# Patient Record
Sex: FEMALE | Race: WHITE | Employment: OTHER | URBAN - METROPOLITAN AREA
[De-identification: names, ages, dates, MRNs, and addresses within clinical notes are randomized per-mention and may not be internally consistent; named-entity substitution may affect disease eponyms.]

---

## 2017-04-21 ENCOUNTER — OP HISTORICAL/CONVERTED ENCOUNTER (OUTPATIENT)
Dept: OTHER | Age: 67
End: 2017-04-21

## 2017-10-26 ENCOUNTER — ED HISTORICAL/CONVERTED ENCOUNTER (OUTPATIENT)
Dept: OTHER | Age: 67
End: 2017-10-26

## 2020-10-23 ENCOUNTER — TRANSCRIBE ORDER (OUTPATIENT)
Dept: SCHEDULING | Age: 70
End: 2020-10-23

## 2020-10-23 DIAGNOSIS — M25.511 RIGHT SHOULDER PAIN: ICD-10-CM

## 2020-10-23 DIAGNOSIS — M25.512 LEFT SHOULDER PAIN: Primary | ICD-10-CM

## 2020-12-15 ENCOUNTER — TRANSCRIBE ORDER (OUTPATIENT)
Dept: SCHEDULING | Age: 70
End: 2020-12-15
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
## 2020-12-15 DIAGNOSIS — M25.511 RIGHT SHOULDER PAIN: Primary | ICD-10-CM

## 2020-12-18 ENCOUNTER — HOSPITAL ENCOUNTER (OUTPATIENT)
Dept: CT IMAGING | Age: 70
Discharge: HOME | End: 2020-12-18
Payer: MEDICARE

## 2020-12-18 DIAGNOSIS — M25.511 RIGHT SHOULDER PAIN: ICD-10-CM
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
## 2020-12-18 PROCEDURE — 73200 CT UPPER EXTREMITY W/O DYE: CPT

## 2023-02-22 ENCOUNTER — TELEPHONE (OUTPATIENT)
Dept: ENT CLINIC | Age: 73
End: 2023-02-22

## 2023-02-22 NOTE — TELEPHONE ENCOUNTER
Attempted to reach pt per referral from [address] [place] [address] and lvm asking pt to call back to schedule

## 2023-03-13 ENCOUNTER — OFFICE VISIT (OUTPATIENT)
Dept: ENT CLINIC | Age: 73
End: 2023-03-13
Payer: MEDICARE

## 2023-03-13 VITALS
BODY MASS INDEX: 35.34 KG/M2 | OXYGEN SATURATION: 93 % | WEIGHT: 180 LBS | RESPIRATION RATE: 18 BRPM | HEART RATE: 70 BPM | DIASTOLIC BLOOD PRESSURE: 60 MMHG | HEIGHT: 60 IN | SYSTOLIC BLOOD PRESSURE: 115 MMHG

## 2023-03-13 DIAGNOSIS — R22.1 NECK MASS: Primary | ICD-10-CM

## 2023-03-13 DIAGNOSIS — R59.0 CERVICAL LYMPHADENOPATHY: ICD-10-CM

## 2023-03-13 DIAGNOSIS — Z85.79 HISTORY OF LYMPHOMA: ICD-10-CM

## 2023-03-13 PROCEDURE — G8432 DEP SCR NOT DOC, RNG: HCPCS | Performed by: OTOLARYNGOLOGY

## 2023-03-13 PROCEDURE — 1123F ACP DISCUSS/DSCN MKR DOCD: CPT | Performed by: OTOLARYNGOLOGY

## 2023-03-13 PROCEDURE — G8427 DOCREV CUR MEDS BY ELIG CLIN: HCPCS | Performed by: OTOLARYNGOLOGY

## 2023-03-13 PROCEDURE — 3074F SYST BP LT 130 MM HG: CPT | Performed by: OTOLARYNGOLOGY

## 2023-03-13 PROCEDURE — G8400 PT W/DXA NO RESULTS DOC: HCPCS | Performed by: OTOLARYNGOLOGY

## 2023-03-13 PROCEDURE — 1101F PT FALLS ASSESS-DOCD LE1/YR: CPT | Performed by: OTOLARYNGOLOGY

## 2023-03-13 PROCEDURE — G8417 CALC BMI ABV UP PARAM F/U: HCPCS | Performed by: OTOLARYNGOLOGY

## 2023-03-13 PROCEDURE — G8536 NO DOC ELDER MAL SCRN: HCPCS | Performed by: OTOLARYNGOLOGY

## 2023-03-13 PROCEDURE — 3017F COLORECTAL CA SCREEN DOC REV: CPT | Performed by: OTOLARYNGOLOGY

## 2023-03-13 PROCEDURE — 99204 OFFICE O/P NEW MOD 45 MIN: CPT | Performed by: OTOLARYNGOLOGY

## 2023-03-13 PROCEDURE — 3078F DIAST BP <80 MM HG: CPT | Performed by: OTOLARYNGOLOGY

## 2023-03-13 PROCEDURE — 1090F PRES/ABSN URINE INCON ASSESS: CPT | Performed by: OTOLARYNGOLOGY

## 2023-03-13 RX ORDER — AMLODIPINE BESYLATE 2.5 MG/1
TABLET ORAL
COMMUNITY
Start: 2023-02-04

## 2023-03-13 RX ORDER — METOPROLOL SUCCINATE 50 MG/1
50 TABLET, EXTENDED RELEASE ORAL DAILY
COMMUNITY
Start: 2023-01-27

## 2023-03-13 RX ORDER — LORAZEPAM 0.5 MG/1
TABLET ORAL
COMMUNITY
Start: 2023-01-12

## 2023-03-13 RX ORDER — ASPIRIN 325 MG
325 TABLET, DELAYED RELEASE (ENTERIC COATED) ORAL DAILY
COMMUNITY
Start: 2023-01-16

## 2023-03-13 RX ORDER — NITROGLYCERIN 120 MG/1
PATCH TRANSDERMAL
COMMUNITY
Start: 2023-02-03

## 2023-03-13 RX ORDER — POTASSIUM CHLORIDE 750 MG/1
TABLET, FILM COATED, EXTENDED RELEASE ORAL
COMMUNITY
Start: 2022-12-14

## 2023-03-13 RX ORDER — PEN NEEDLE, DIABETIC 31 GX5/16"
NEEDLE, DISPOSABLE MISCELLANEOUS
COMMUNITY
Start: 2023-02-11

## 2023-03-13 RX ORDER — ISOSORBIDE MONONITRATE 10 MG/1
10 TABLET ORAL 2 TIMES DAILY
COMMUNITY
Start: 2022-06-01

## 2023-03-13 RX ORDER — ROSUVASTATIN CALCIUM 40 MG/1
TABLET, COATED ORAL
COMMUNITY
Start: 2022-12-26

## 2023-03-13 NOTE — PROGRESS NOTES
Subjective:    Echo Coronado   71 y.o.   1950     New Patient Visit    History of Present Illness:    45-year-old female history of non-Hodgkin's lymphoma sent by Dr. Aric Morillo for evaluation of a right submandibular lymph node. Patient states she noted the lump a few months back, she did see her oncologist was set for PET scan was done at [facility] and apparently the right submandibular lymph node is positive. She states she has had lymphoma x2 initially treated with IV chemotherapy and then had a recurrence for which she was treated with oral chemotherapy and states she was in remission for about 5 years. She denies any constitutional symptoms at this point. She is a smoker and she denies any upper aerodigestive symptoms such as throat pain, dysphagia, voice changes, otalgia. She does have diabetes and heart disease. She sees Dr. Isidro Griffith. Review of Systems  Review of Systems   Constitutional:  Negative for chills and fever. HENT:  Negative for ear pain, hearing loss, nosebleeds and tinnitus. Eyes:  Negative for blurred vision and double vision. Respiratory:  Negative for cough, sputum production and shortness of breath. Cardiovascular:  Negative for chest pain and palpitations. Gastrointestinal:  Negative for heartburn, nausea and vomiting. Musculoskeletal:  Negative for joint pain and neck pain. Skin: Negative. Neurological:  Negative for dizziness, speech change, weakness and headaches. Endo/Heme/Allergies:  Negative for environmental allergies. Does not bruise/bleed easily. Psychiatric/Behavioral:  Negative for memory loss. The patient does not have insomnia.         Past Medical History:   Diagnosis Date    Aneurysm ([facility]     Asthma     DM (diabetes mellitus) ([facility]     Heart disease     HTN (hypertension)     [facility]      Past Surgical History:   Procedure Laterality Date    HX CHOLECYSTECTOMY      HX CORONARY ARTERY BYPASS GRAFT      HX HYSTERECTOMY      HX OTHER SURGICAL      lumbar    HX POLYPECTOMY      HX TUMOR REMOVAL        Family History   Problem Relation Age of Onset    Diabetes Father     Heart Disease Father      Social History     Tobacco Use    Smoking status: Every Day     Packs/day: 0.50     Types: Cigarettes    Smokeless tobacco: Never   Substance Use Topics    Alcohol use: No      Prior to Admission medications    Medication Sig Start Date End Date Taking? Authorizing Provider   amLODIPine (NORVASC) 2.5 mg tablet TAKE 1 TABLET BY MOUTH EVERY DAY FOR BLOOD PRESSURE - HOLD FOR BLOOD PRESSURE UNDER 100/60 2/4/23  Yes Provider, Historical   isosorbide mononitrate (ISMO, MONOKET) 10 mg tablet Take 10 mg by mouth two (2) times a day. 6/1/22  Yes Provider, Historical   LORazepam (ATIVAN) 0.5 mg tablet  1/12/23  Yes Provider, Historical   nitroglycerin (NITRODUR) 0.6 mg/hr pt24 UNWRAP AND APPLY 1 PATCH TO TO SKIN DAILY REMOVE AFTER 12 HOURS 2/3/23  Yes Provider, Historical   BD Ultra-Fine Mini Pen Needle 31 gauge x 3/16\" ndle USE WITH PENS AS DIRECTED BEFORE MEALS 2/11/23  Yes Provider, Historical   potassium chloride SR (KLOR-CON 10) 10 mEq tablet  12/14/22  Yes Provider, Historical   rosuvastatin (CRESTOR) 40 mg tablet  12/26/22  Yes Provider, Historical   metFORMIN (GLUCOPHAGE) 1,000 mg tablet Take 1,000 mg by mouth two (2) times daily (with meals). Yes Provider, Historical   losartan (COZAAR) 100 mg tablet Take 100 mg by mouth daily. Yes Provider, Historical   tiotropium (SPIRIVA) 18 mcg inhalation capsule Take 1 Cap by inhalation daily. Yes Provider, Historical   aspirin (ASPIRIN) 325 mg tablet Take 325 mg by mouth daily. Yes Provider, Historical   omeprazole (PRILOSEC) 20 mg capsule Take 20 mg by mouth daily. Yes Provider, Historical   furosemide (LASIX) 20 mg tablet Take  by mouth daily. Yes Provider, Historical   montelukast (SINGULAIR) 10 mg tablet Take 10 mg by mouth as needed.      Yes Provider, Historical   hydrOXYzine (ATARAX) 25 mg tablet Take  by mouth three (3) times daily as needed. Yes Provider, Historical   cyanocobalamin (VITAMIN B12) 1,000 mcg/mL injection 1,000 mcg by IntraMUSCular route once. Yes Provider, Historical   albuterol (PROVENTIL HFA, VENTOLIN HFA, PROAIR HFA) 90 mcg/actuation inhaler Take 1 Puff by inhalation. Yes Provider, Historical   fluticasone propion-salmeteroL (ADVAIR/WIXELA) 250-50 mcg/dose diskus inhaler Take 1 Puff by inhalation every twelve (12) hours. Yes Provider, Historical   metoprolol-XL (TOPROL-XL) 100 mg XL tablet Take  by mouth daily. Yes Provider, Historical   aspirin delayed-release 325 mg tablet Take 325 mg by mouth daily. Patient not taking: Reported on 3/13/2023 1/16/23   Provider, Historical   metoprolol succinate (TOPROL-XL) 50 mg XL tablet Take 50 mg by mouth daily. Patient not taking: Reported on 3/13/2023 1/27/23   Provider, Historical   diazePAM (VALIUM) 10 mg tablet Take 10 mg by mouth every six (6) hours as needed for Anxiety. Patient not taking: Reported on 3/13/2023    Provider, Historical   gabapentin (NEURONTIN) 100 mg capsule Take  by mouth three (3) times daily. Patient not taking: Reported on 3/13/2023    Provider, Historical   ezetimibe-simvastatin (VYTORIN) 10-40 mg per tablet Take 1 Tab by mouth nightly. Patient not taking: Reported on 3/13/2023    Provider, Historical        No Known Allergies      Objective:     Visit Vitals  /60 (BP 1 Location: Left upper arm, BP Patient Position: Sitting, BP Cuff Size: Adult)   Pulse 70   Resp 18   Ht 5' (1.524 m)   Wt 180 lb (81.6 kg)   SpO2 93%   BMI 35.15 kg/m²        Physical Exam  Vitals reviewed. Constitutional:       General: She is awake. She is not in acute distress. Appearance: Normal appearance. She is well-groomed. She is obese. HENT:      Head: Normocephalic and atraumatic. Jaw: There is normal jaw occlusion. No trismus, tenderness or malocclusion.       Salivary Glands: Right salivary gland is not diffusely enlarged or tender. Left salivary gland is not diffusely enlarged or tender. Right Ear: Hearing, tympanic membrane, ear canal and external ear normal.      Left Ear: Hearing, tympanic membrane, ear canal and external ear normal.      Nose: No nasal deformity, septal deviation or mucosal edema. Right Nostril: No epistaxis. Left Nostril: No epistaxis. Right Turbinates: Not enlarged, swollen or pale. Left Turbinates: Not enlarged, swollen or pale. Right Sinus: No maxillary sinus tenderness or frontal sinus tenderness. Left Sinus: No maxillary sinus tenderness or frontal sinus tenderness. Mouth/Throat:      Lips: Pink. No lesions. Mouth: Mucous membranes are moist. Oral lesions (Bite fibroma right buccal) present. Dentition: Has dentures. No dental caries. Tongue: No lesions. Palate: No mass and lesions. Pharynx: Oropharynx is clear. Uvula midline. No oropharyngeal exudate or posterior oropharyngeal erythema. Tonsils: No tonsillar exudate. 0 on the right. 0 on the left. Eyes:      General: Vision grossly intact. Extraocular Movements: Extraocular movements intact. Right eye: No nystagmus. Left eye: No nystagmus. Conjunctiva/sclera: Conjunctivae normal.      Pupils: Pupils are equal, round, and reactive to light. Neck:      Thyroid: No thyroid mass, thyromegaly or thyroid tenderness. Trachea: Trachea and phonation normal. No tracheal tenderness or tracheal deviation. Comments: 2 cm palpable right submandibular lymph node  Cardiovascular:      Rate and Rhythm: Normal rate and regular rhythm. Pulmonary:      Effort: Pulmonary effort is normal. No respiratory distress. Breath sounds: No stridor. Musculoskeletal:         General: No swelling or tenderness. Normal range of motion. Cervical back: No edema or erythema. Lymphadenopathy:      Cervical: Cervical adenopathy present. Right cervical: Deep cervical adenopathy present. Skin:     General: Skin is warm and dry. Findings: No lesion or rash. Neurological:      General: No focal deficit present. Mental Status: She is alert and oriented to person, place, and time. Mental status is at baseline. Coordination: Romberg sign negative. Gait: Gait is intact. Psychiatric:         Mood and Affect: Mood normal.         Behavior: Behavior normal. Behavior is cooperative. Assessment/Plan:     Encounter Diagnoses   Name Primary? Neck mass Yes    Cervical lymphadenopathy     History of lymphoma      Patient does have a palpable right submandibular lymph node, obviously main concern would be recurrence of lymphoma. However she also is a longstanding smoker and always a possibility of a different diagnosis. Do not see evidence of head neck primary at this point. I will contact her oncologist and also review her imaging personally. Most likely will proceed with open excisional biopsy as the main concern will be lymphoma evaluation. She will also need clearance from her cardiologist.    We will contact the patient to arrange. Orders Placed This Encounter    amLODIPine (NORVASC) 2.5 mg tablet    aspirin delayed-release 325 mg tablet    isosorbide mononitrate (ISMO, MONOKET) 10 mg tablet    LORazepam (ATIVAN) 0.5 mg tablet    metoprolol succinate (TOPROL-XL) 50 mg XL tablet    nitroglycerin (NITRODUR) 0.6 mg/hr pt24    BD Ultra-Fine Mini Pen Needle 31 gauge x 3/16\" ndle    potassium chloride SR (KLOR-CON 10) 10 mEq tablet    rosuvastatin (CRESTOR) 40 mg tablet           Thank you for referring this patient,    Bartolome Villar MD, [address] [facility]    [address]  [facility]    [address] [id number]  [place], [place] [address] [place]

## 2023-03-20 ENCOUNTER — TELEPHONE (OUTPATIENT)
Dept: ENT CLINIC | Age: 73
End: 2023-03-20

## 2023-03-20 NOTE — TELEPHONE ENCOUNTER
Lvm for pt to call back at my direct line to schedule surgery and post op appts. Pt will be scheduled at [address] [place] [address] for excisional biopsy of right submandibular lymph node.    She will need cardiac clearance from her provider, Dr. Natalia Tejeda

## 2023-04-03 ENCOUNTER — TELEPHONE (OUTPATIENT)
Dept: ENT CLINIC | Age: 73
End: 2023-04-03

## 2023-04-03 DIAGNOSIS — R59.0 SUBMANDIBULAR LYMPHADENOPATHY: Primary | ICD-10-CM

## 2023-04-03 DIAGNOSIS — Z85.72 HISTORY OF LYMPHOMA: ICD-10-CM

## 2023-04-03 NOTE — TELEPHONE ENCOUNTER
I called patient today to discuss the results of her cardiac clearance. She is considered high risk for surgical excisional biopsy under general anesthesia due to her significant coronary artery disease. I discussed her case with Dr. Vivian Akbar her oncologist and we decided it would be best to offer the patient either a core needle biopsy with interventional radiology or we could consider a attempt at incisional lymph node biopsy under local anesthesia. The patient would prefer to do a core needle biopsy so we will order that. We will cancel at this point her surgery for April 13.

## 2023-04-14 ENCOUNTER — HOSPITAL ENCOUNTER (OUTPATIENT)
Dept: INTERVENTIONAL RADIOLOGY/VASCULAR | Age: 73
Discharge: HOME | End: 2023-04-14
Attending: OTOLARYNGOLOGY
Payer: MEDICARE

## 2023-04-14 DIAGNOSIS — Z85.72 HISTORY OF LYMPHOMA: ICD-10-CM

## 2023-04-14 DIAGNOSIS — R59.0 CERVICAL LYMPHADENOPATHY: ICD-10-CM

## 2023-04-14 PROCEDURE — 88342 IMHCHEM/IMCYTCHM 1ST ANTB: CPT

## 2023-04-14 PROCEDURE — 88185 FLOWCYTOMETRY/TC ADD-ON: CPT

## 2023-04-14 PROCEDURE — 77030039946 HC NDL BIOP MRTM -B

## 2023-04-14 PROCEDURE — 88333 PATH CONSLTJ SURG CYTO XM 1: CPT

## 2023-04-14 PROCEDURE — 88184 FLOWCYTOMETRY/ TC 1 MARKER: CPT

## 2023-04-14 PROCEDURE — 88305 TISSUE EXAM BY PATHOLOGIST: CPT

## 2023-04-14 PROCEDURE — 88341 IMHCHEM/IMCYTCHM EA ADD ANTB: CPT

## 2023-04-14 PROCEDURE — 38505 NEEDLE BIOPSY LYMPH NODES: CPT

## 2023-04-14 PROCEDURE — 88360 TUMOR IMMUNOHISTOCHEM/MANUAL: CPT

## 2023-04-18 NOTE — PROGRESS NOTES
I am forwarding your result to Dr Larissa Garcia, he will let you know next steps.   -Dr Tamela Murrell

## 2025-05-20 NOTE — H&P
This 74 y.o. year old female presents with complaints of  basal cell carcinoma of nose.          Conservative measures have been exhausted prior to the decision for surgery. Patient has discussed the risks, alternatives, and benefits of the surgery with surgeon and has elected to proceed with surgical intervention.    Type of surgery : Procedure(s) (LRB):  NASAL MOHS RECONSTRUCTION, POSSIBLE LOCAL FLAP, POSSIBLE FOREHEAD FLAP (ANES GENERAL ET) (N/A)  Date of procedure:  NO DATE HAS BEEN ASSIGNED   Surgeon: Primary: Ki Dean MD     PCP: Amanda Holley FNP   Specialists: Dr. Gifford (Cardiology), Dr. Infante (Hem/Onc), Dr. Hernandez (Pulmonology), Dr. Albarran (Nephrology)      Per chart review of Dr. Gifford's note on 4/23, it states \"Preop cardiac clearance for nose surgery.  Patient's occasional chest discomfort  on moderate exertion activities  has resolved  on current antianginal regimen.  As discussed above, patient's past cardiac catheterization has shown critical multivessel coronary artery disease with left main involvement and  3/4 bypass grafts including LIMA are totally occluded.  Patient has not been considered a suitable candidate for any further PCI or redo CABG  procedure.  Patient will be high risk for any surgery under general anesthesia with endotracheal intubation.  Patient however is at acceptable cardiac risk for her nose surgery  with local anesthesia and IV conscious sedation.\"    Patient is on 2L of Oxygen via NC.      LMP (If applicable): N/A      Hx of Psychological Disorders: No    Hx of Seizure: No      Hx of Cancer: Yes    Hx of Autoimmune Disorders: No    Hx of Anemias: Yes    Hx of Blood Transfusions: No    Personal Hx of Blood Clotting Disorder/DVT/PE: No    Family Hx of Blood Clotting Disorder/DVT/PE: No    Hx of Hormone Disorders: Yes    Hx of DM: Yes     Hx of CVA/TIA: No     Hearing Aids: No     Implantable Devices (Defibrillator, Pacemaker, nerve stimulator,

## 2025-05-27 ENCOUNTER — HOSPITAL ENCOUNTER (OUTPATIENT)
Facility: HOSPITAL | Age: 75
Discharge: HOME | End: 2025-05-30
Payer: MEDICARE

## 2025-05-27 VITALS
OXYGEN SATURATION: 88 % | BODY MASS INDEX: 32.94 KG/M2 | DIASTOLIC BLOOD PRESSURE: 84 MMHG | RESPIRATION RATE: 16 BRPM | HEIGHT: 60 IN | HEART RATE: 58 BPM | SYSTOLIC BLOOD PRESSURE: 150 MMHG | WEIGHT: 167.77 LBS | TEMPERATURE: 97.3 F

## 2025-05-27 LAB
ANION GAP SERPL CALC-SCNC: 3 MMOL/L (ref 2–12)
BASOPHILS # BLD: 0.03 K/UL (ref 0–0.1)
BASOPHILS NFR BLD: 0.4 % (ref 0–1)
BUN SERPL-MCNC: 25 MG/DL (ref 6–20)
BUN/CREAT SERPL: 19 (ref 12–20)
CALCIUM SERPL-MCNC: 9.2 MG/DL (ref 8.5–10.1)
CHLORIDE SERPL-SCNC: 115 MMOL/L (ref 97–108)
CO2 SERPL-SCNC: 30 MMOL/L (ref 21–32)
CREAT SERPL-MCNC: 1.33 MG/DL (ref 0.55–1.02)
DIFFERENTIAL METHOD BLD: ABNORMAL
EOSINOPHIL # BLD: 0.18 K/UL (ref 0–0.4)
EOSINOPHIL NFR BLD: 2.3 % (ref 0–7)
ERYTHROCYTE [DISTWIDTH] IN BLOOD BY AUTOMATED COUNT: 15 % (ref 11.5–14.5)
GLUCOSE SERPL-MCNC: 139 MG/DL (ref 65–100)
HCT VFR BLD AUTO: 40 % (ref 35–47)
HGB BLD-MCNC: 12.8 G/DL (ref 11.5–16)
IMM GRANULOCYTES # BLD AUTO: 0.03 K/UL (ref 0–0.04)
IMM GRANULOCYTES NFR BLD AUTO: 0.4 % (ref 0–0.5)
LYMPHOCYTES # BLD: 1.86 K/UL (ref 0.8–3.5)
LYMPHOCYTES NFR BLD: 23.5 % (ref 12–49)
MCH RBC QN AUTO: 29.7 PG (ref 26–34)
MCHC RBC AUTO-ENTMCNC: 32 G/DL (ref 30–36.5)
MCV RBC AUTO: 92.8 FL (ref 80–99)
MONOCYTES # BLD: 0.67 K/UL (ref 0–1)
MONOCYTES NFR BLD: 8.5 % (ref 5–13)
NEUTS SEG # BLD: 5.13 K/UL (ref 1.8–8)
NEUTS SEG NFR BLD: 64.9 % (ref 32–75)
NRBC # BLD: 0 K/UL (ref 0–0.01)
NRBC BLD-RTO: 0 PER 100 WBC
PLATELET # BLD AUTO: 233 K/UL (ref 150–400)
PMV BLD AUTO: 9.8 FL (ref 8.9–12.9)
POTASSIUM SERPL-SCNC: 4.5 MMOL/L (ref 3.5–5.1)
RBC # BLD AUTO: 4.31 M/UL (ref 3.8–5.2)
SODIUM SERPL-SCNC: 148 MMOL/L (ref 136–145)
WBC # BLD AUTO: 7.9 K/UL (ref 3.6–11)

## 2025-05-27 PROCEDURE — 80048 BASIC METABOLIC PNL TOTAL CA: CPT

## 2025-05-27 PROCEDURE — 85025 COMPLETE CBC W/AUTO DIFF WBC: CPT

## 2025-05-27 PROCEDURE — 93005 ELECTROCARDIOGRAM TRACING: CPT | Performed by: OTOLARYNGOLOGY

## 2025-05-27 PROCEDURE — 36415 COLL VENOUS BLD VENIPUNCTURE: CPT

## 2025-05-27 RX ORDER — FLUTICASONE FUROATE, UMECLIDINIUM BROMIDE AND VILANTEROL TRIFENATATE 100; 62.5; 25 UG/1; UG/1; UG/1
1 POWDER RESPIRATORY (INHALATION) DAILY
COMMUNITY

## 2025-05-27 RX ORDER — POTASSIUM CHLORIDE 750 MG/1
10 CAPSULE, EXTENDED RELEASE ORAL DAILY
COMMUNITY

## 2025-05-27 RX ORDER — ROSUVASTATIN CALCIUM 40 MG/1
40 TABLET, COATED ORAL EVERY EVENING
COMMUNITY

## 2025-05-27 RX ORDER — CLOTRIMAZOLE AND BETAMETHASONE DIPROPIONATE 10; .64 MG/G; MG/G
CREAM TOPICAL 2 TIMES DAILY
COMMUNITY

## 2025-05-27 RX ORDER — AMLODIPINE BESYLATE 2.5 MG/1
2.5 TABLET ORAL DAILY
COMMUNITY

## 2025-05-27 RX ORDER — ISOSORBIDE DINITRATE 20 MG/1
20 TABLET ORAL 2 TIMES DAILY
COMMUNITY

## 2025-05-27 RX ORDER — TEMAZEPAM 7.5 MG/1
7.5 CAPSULE ORAL NIGHTLY PRN
COMMUNITY

## 2025-05-27 ASSESSMENT — PAIN DESCRIPTION - DESCRIPTORS: DESCRIPTORS: ACHING

## 2025-05-27 ASSESSMENT — ENCOUNTER SYMPTOMS
STRIDOR: 0
SINUS PAIN: 0
COUGH: 0
CONSTIPATION: 0
TROUBLE SWALLOWING: 0
CHEST TIGHTNESS: 0
SORE THROAT: 0
VOICE CHANGE: 0
WHEEZING: 0
CHOKING: 0
RHINORRHEA: 0
NAUSEA: 0
COLOR CHANGE: 1
SINUS PRESSURE: 0
SHORTNESS OF BREATH: 0
APNEA: 0

## 2025-05-27 ASSESSMENT — PAIN DESCRIPTION - ORIENTATION: ORIENTATION: LEFT

## 2025-05-27 ASSESSMENT — PAIN DESCRIPTION - LOCATION: LOCATION: NOSE

## 2025-05-27 ASSESSMENT — PAIN SCALES - GENERAL: PAINLEVEL_OUTOF10: 7

## 2025-05-27 NOTE — PERIOP NOTE
Call daughter for information , Mary [phone].    Instructed pt to have nail polish removed, she refused. Pt's 02  sat is always between 88-92%, dry bandage on left  side of nose, reviewed instructions for surgery in detail. Pt verbalized understanding. Pt has diabetic sensor on right upper arm.     Pt is prescribed aspirin 325mg for cerebral aneurysm, she states that she tried to contact him for instructions, and was unsuccessful so she stopped her aspirin on 5/21/25.     Pt stopped her metformin when she stopped her aspirin, 5/21/25, instructed pt to restart Metformin and take it until the day before surgery, and not the day of surgery. Pt has some difficulty with instructions.

## 2025-05-27 NOTE — DISCHARGE INSTRUCTIONS
[facility]                   [address] [place] [address]   MAIN OR                                  [phone]   MAIN PRE OP                          [phone]                                                                                AMBULATORY PRE OP          [phone]  PRE-ADMISSION TESTING    [phone]   Surgery Date:  6/4/25       Is surgery arrival time given by surgeon?  YES  NO  If “NO”, [facility] staff will call you between 3 and 7pm the day before your surgery with your arrival time. (If your surgery is on a Monday, we will call you the Friday before.)    Call [phone] after 7pm Monday-Friday if you did not receive this call.    INSTRUCTIONS BEFORE YOUR SURGERY   When You  Arrive Arrive at the 2nd Floor Admitting Desk on the day of your surgery  Have your insurance card, photo ID, and any copayment (if needed)   Food   and   Drink No food or drink (gum , mints, coffee, juice, etc)after midnight the night before surgery.   You may drink WATER ONLY up until 2 hours prior to your surgery time. Please do not add anything to your water as this may result in your surgery being postponed.    No alcohol (beer, wine, liquor) 24 hours before and after surgery   Medications to   TAKE   Morning of Surgery MEDICATIONS TO TAKE THE MORNING OF SURGERY WITH A SIP OF WATER:   Bring albuterol inhaler  Amlodipine  Metoprolol  Omeprazole  Use Trelegy inhaler   Medications  To  STOP      7 days before surgery Non-Steroidal anti-inflammatory Drugs (NSAID's): for example, Ibuprofen (Advil, Motrin), Naproxen (Aleve)  Aspirin, if taking for pain   Herbal supplements, vitamins, and fish oil  Other:  (Pain medications not listed above, including Tylenol may be taken)   Blood  Thinners If you take  Aspirin, Plavix, Coumadin, or any blood-thinning or anti-blood clot medicine, talk to the doctor who prescribed the medications for pre-operative instructions.   Bathing

## 2025-05-28 LAB
EKG ATRIAL RATE: 61 BPM
EKG DIAGNOSIS: NORMAL
EKG P AXIS: 55 DEGREES
EKG P-R INTERVAL: 258 MS
EKG Q-T INTERVAL: 446 MS
EKG QRS DURATION: 96 MS
EKG QTC CALCULATION (BAZETT): 448 MS
EKG R AXIS: -16 DEGREES
EKG T AXIS: 93 DEGREES
EKG VENTRICULAR RATE: 61 BPM

## 2025-05-28 PROCEDURE — 93010 ELECTROCARDIOGRAM REPORT: CPT | Performed by: INTERNAL MEDICINE
